# Patient Record
Sex: MALE | Race: WHITE
[De-identification: names, ages, dates, MRNs, and addresses within clinical notes are randomized per-mention and may not be internally consistent; named-entity substitution may affect disease eponyms.]

---

## 2017-01-09 ENCOUNTER — APPOINTMENT (OUTPATIENT)
Dept: PEDIATRICS | Facility: CLINIC | Age: 22
End: 2017-01-09

## 2018-05-08 ENCOUNTER — INPATIENT (INPATIENT)
Facility: HOSPITAL | Age: 23
LOS: 2 days | Discharge: HOME | End: 2018-05-11
Attending: THORACIC SURGERY (CARDIOTHORACIC VASCULAR SURGERY) | Admitting: THORACIC SURGERY (CARDIOTHORACIC VASCULAR SURGERY)
Payer: COMMERCIAL

## 2018-05-08 ENCOUNTER — APPOINTMENT (OUTPATIENT)
Dept: THORACIC SURGERY | Facility: HOSPITAL | Age: 23
End: 2018-05-08

## 2018-05-08 VITALS — WEIGHT: 145.06 LBS | OXYGEN SATURATION: 99 % | HEART RATE: 92 BPM | HEIGHT: 66 IN

## 2018-05-08 DIAGNOSIS — J93.9 PNEUMOTHORAX, UNSPECIFIED: ICD-10-CM

## 2018-05-08 LAB
ALBUMIN SERPL ELPH-MCNC: 4.5 G/DL — SIGNIFICANT CHANGE UP (ref 3.3–5)
ALP SERPL-CCNC: 67 U/L — SIGNIFICANT CHANGE UP (ref 40–120)
ALT FLD-CCNC: 17 U/L — SIGNIFICANT CHANGE UP (ref 12–78)
ANION GAP SERPL CALC-SCNC: 7 MMOL/L — SIGNIFICANT CHANGE UP (ref 5–17)
AST SERPL-CCNC: 17 U/L — SIGNIFICANT CHANGE UP (ref 15–37)
BASOPHILS # BLD AUTO: 0.03 K/UL — SIGNIFICANT CHANGE UP (ref 0–0.2)
BASOPHILS NFR BLD AUTO: 0.3 % — SIGNIFICANT CHANGE UP (ref 0–2)
BILIRUB SERPL-MCNC: 1.8 MG/DL — HIGH (ref 0.2–1.2)
BUN SERPL-MCNC: 15 MG/DL — SIGNIFICANT CHANGE UP (ref 7–23)
CALCIUM SERPL-MCNC: 9.3 MG/DL — SIGNIFICANT CHANGE UP (ref 8.5–10.1)
CHLORIDE SERPL-SCNC: 107 MMOL/L — SIGNIFICANT CHANGE UP (ref 96–108)
CO2 SERPL-SCNC: 28 MMOL/L — SIGNIFICANT CHANGE UP (ref 22–31)
CREAT SERPL-MCNC: 1.08 MG/DL — SIGNIFICANT CHANGE UP (ref 0.5–1.3)
EOSINOPHIL # BLD AUTO: 0.08 K/UL — SIGNIFICANT CHANGE UP (ref 0–0.5)
EOSINOPHIL NFR BLD AUTO: 0.8 % — SIGNIFICANT CHANGE UP (ref 0–6)
GLUCOSE SERPL-MCNC: 90 MG/DL — SIGNIFICANT CHANGE UP (ref 70–99)
HCT VFR BLD CALC: 47 % — SIGNIFICANT CHANGE UP (ref 39–50)
HGB BLD-MCNC: 16.3 G/DL — SIGNIFICANT CHANGE UP (ref 13–17)
IMM GRANULOCYTES NFR BLD AUTO: 0.3 % — SIGNIFICANT CHANGE UP (ref 0–1.5)
LYMPHOCYTES # BLD AUTO: 1.39 K/UL — SIGNIFICANT CHANGE UP (ref 1–3.3)
LYMPHOCYTES # BLD AUTO: 14.6 % — SIGNIFICANT CHANGE UP (ref 13–44)
MCHC RBC-ENTMCNC: 29.7 PG — SIGNIFICANT CHANGE UP (ref 27–34)
MCHC RBC-ENTMCNC: 34.7 GM/DL — SIGNIFICANT CHANGE UP (ref 32–36)
MCV RBC AUTO: 85.8 FL — SIGNIFICANT CHANGE UP (ref 80–100)
MONOCYTES # BLD AUTO: 0.8 K/UL — SIGNIFICANT CHANGE UP (ref 0–0.9)
MONOCYTES NFR BLD AUTO: 8.4 % — SIGNIFICANT CHANGE UP (ref 2–14)
NEUTROPHILS # BLD AUTO: 7.16 K/UL — SIGNIFICANT CHANGE UP (ref 1.8–7.4)
NEUTROPHILS NFR BLD AUTO: 75.6 % — SIGNIFICANT CHANGE UP (ref 43–77)
NRBC # BLD: 0 /100 WBCS — SIGNIFICANT CHANGE UP (ref 0–0)
PLATELET # BLD AUTO: 329 K/UL — SIGNIFICANT CHANGE UP (ref 150–400)
POTASSIUM SERPL-MCNC: 3.8 MMOL/L — SIGNIFICANT CHANGE UP (ref 3.5–5.3)
POTASSIUM SERPL-SCNC: 3.8 MMOL/L — SIGNIFICANT CHANGE UP (ref 3.5–5.3)
PROT SERPL-MCNC: 7.9 GM/DL — SIGNIFICANT CHANGE UP (ref 6–8.3)
RBC # BLD: 5.48 M/UL — SIGNIFICANT CHANGE UP (ref 4.2–5.8)
RBC # FLD: 12.5 % — SIGNIFICANT CHANGE UP (ref 10.3–14.5)
SODIUM SERPL-SCNC: 142 MMOL/L — SIGNIFICANT CHANGE UP (ref 135–145)
WBC # BLD: 9.49 K/UL — SIGNIFICANT CHANGE UP (ref 3.8–10.5)
WBC # FLD AUTO: 9.49 K/UL — SIGNIFICANT CHANGE UP (ref 3.8–10.5)

## 2018-05-08 PROCEDURE — 32550 INSERT PLEURAL CATH: CPT

## 2018-05-08 PROCEDURE — 71045 X-RAY EXAM CHEST 1 VIEW: CPT | Mod: 26

## 2018-05-08 PROCEDURE — 99285 EMERGENCY DEPT VISIT HI MDM: CPT

## 2018-05-08 PROCEDURE — 71045 X-RAY EXAM CHEST 1 VIEW: CPT | Mod: 26,77,76

## 2018-05-08 RX ORDER — DOCUSATE SODIUM 100 MG
100 CAPSULE ORAL THREE TIMES A DAY
Qty: 0 | Refills: 0 | Status: DISCONTINUED | OUTPATIENT
Start: 2018-05-08 | End: 2018-05-11

## 2018-05-08 RX ORDER — IBUPROFEN 200 MG
400 TABLET ORAL EVERY 4 HOURS
Qty: 0 | Refills: 0 | Status: DISCONTINUED | OUTPATIENT
Start: 2018-05-08 | End: 2018-05-09

## 2018-05-08 RX ORDER — OXYCODONE AND ACETAMINOPHEN 5; 325 MG/1; MG/1
1 TABLET ORAL EVERY 4 HOURS
Qty: 0 | Refills: 0 | Status: DISCONTINUED | OUTPATIENT
Start: 2018-05-08 | End: 2018-05-09

## 2018-05-08 RX ORDER — MORPHINE SULFATE 50 MG/1
4 CAPSULE, EXTENDED RELEASE ORAL
Qty: 0 | Refills: 0 | Status: DISCONTINUED | OUTPATIENT
Start: 2018-05-08 | End: 2018-05-09

## 2018-05-08 RX ORDER — SENNA PLUS 8.6 MG/1
2 TABLET ORAL AT BEDTIME
Qty: 0 | Refills: 0 | Status: DISCONTINUED | OUTPATIENT
Start: 2018-05-08 | End: 2018-05-11

## 2018-05-08 RX ADMIN — OXYCODONE AND ACETAMINOPHEN 1 TABLET(S): 5; 325 TABLET ORAL at 21:46

## 2018-05-08 RX ADMIN — MORPHINE SULFATE 4 MILLIGRAM(S): 50 CAPSULE, EXTENDED RELEASE ORAL at 18:50

## 2018-05-08 RX ADMIN — OXYCODONE AND ACETAMINOPHEN 1 TABLET(S): 5; 325 TABLET ORAL at 19:45

## 2018-05-08 RX ADMIN — Medication 100 MILLIGRAM(S): at 22:25

## 2018-05-08 RX ADMIN — MORPHINE SULFATE 4 MILLIGRAM(S): 50 CAPSULE, EXTENDED RELEASE ORAL at 22:23

## 2018-05-08 NOTE — ED PROVIDER NOTE - PROGRESS NOTE DETAILS
Erick Gomez: Dr. Ross spoke with cardiothoracic surgeon Dr. Osmany Mayen who will come in to place a pigtail catheter. Will obtain cardiothoracic consultation. Erick Gomez: Dr. Ross spoke with cardiothoracic surgeon Dr. Osmany Mayen who will come in to place a pigtail catheter. Will obtain cardiothoracic consent for procedure. Cardiothoracic consultation is appreciated.

## 2018-05-08 NOTE — ED ADULT TRIAGE NOTE - CHIEF COMPLAINT QUOTE
Patient sent in by Dr osorio reports left sided pneumothorax with shortness of breath. Patient has history of asthma and started wheezing yesterday Patient sent in by Dr osorio reports left sided pneumothorax with shortness of breath and chest pain. Patient has history of asthma and started wheezing yesterday

## 2018-05-08 NOTE — ED PROVIDER NOTE - OBJECTIVE STATEMENT
22 y/o male with PMHx of asthma presents to the ED c/o SOB and left sided CP after waking up this AM. Pt states he visited his PMD today who [facility]. Pt was sent to the ED after xray findings of a left sided pneumothorax, moderately extending to upper, middle and lower hemithorax. Pt is currently breathing on his own. No tachypnea, retractions, fever, chills, neck rigidity or stiffness, or abd pain.

## 2018-05-08 NOTE — ED PROVIDER NOTE - NS_ ATTENDINGSCRIBEDETAILS _ED_A_ED_FT
I Corbin Ross MD saw and examined the patient. Scribe documented for me and under my supervision. I have modified the scribe's documentation where necessary to reflect my history, physical exam and other relevant documentations pertinent to the care of the patient.

## 2018-05-08 NOTE — CONSULT NOTE ADULT - SUBJECTIVE AND OBJECTIVE BOX
22 y/o M with complaints of shortness of breath and chest pain x 1 day.  Pt found to have a large left spontaenous pneumothorax.  Left pigtail catheter placed in Ed with post procedure cxr showing good re-expansion.  Pt currentlt resting comfortably.    PMHx-Asthma  PSHx-none    Physical Exam  HEENT-no lymphadenopathy  Lungs-B/L BS+  Chest-Left pigtail-intact, no air leak

## 2018-05-09 DIAGNOSIS — J45.20 MILD INTERMITTENT ASTHMA, UNCOMPLICATED: ICD-10-CM

## 2018-05-09 PROCEDURE — 71045 X-RAY EXAM CHEST 1 VIEW: CPT | Mod: 26

## 2018-05-09 RX ORDER — ALBUTEROL 90 UG/1
1 AEROSOL, METERED ORAL EVERY 4 HOURS
Qty: 0 | Refills: 0 | Status: DISCONTINUED | OUTPATIENT
Start: 2018-05-09 | End: 2018-05-11

## 2018-05-09 RX ORDER — ONDANSETRON 8 MG/1
4 TABLET, FILM COATED ORAL ONCE
Qty: 0 | Refills: 0 | Status: COMPLETED | OUTPATIENT
Start: 2018-05-09 | End: 2018-05-09

## 2018-05-09 RX ORDER — MONTELUKAST 4 MG/1
1 TABLET, CHEWABLE ORAL
Qty: 0 | Refills: 0 | COMMUNITY

## 2018-05-09 RX ORDER — ALBUTEROL 90 UG/1
2.5 AEROSOL, METERED ORAL EVERY 6 HOURS
Qty: 0 | Refills: 0 | Status: DISCONTINUED | OUTPATIENT
Start: 2018-05-09 | End: 2018-05-11

## 2018-05-09 RX ORDER — FLUTICASONE PROPIONATE AND SALMETEROL 50; 250 UG/1; UG/1
1 POWDER ORAL; RESPIRATORY (INHALATION)
Qty: 0 | Refills: 0 | COMMUNITY

## 2018-05-09 RX ORDER — LORATADINE 10 MG/1
10 TABLET ORAL DAILY
Qty: 0 | Refills: 0 | Status: DISCONTINUED | OUTPATIENT
Start: 2018-05-09 | End: 2018-05-11

## 2018-05-09 RX ORDER — MONTELUKAST 4 MG/1
10 TABLET, CHEWABLE ORAL AT BEDTIME
Qty: 0 | Refills: 0 | Status: DISCONTINUED | OUTPATIENT
Start: 2018-05-09 | End: 2018-05-11

## 2018-05-09 RX ORDER — KETOROLAC TROMETHAMINE 30 MG/ML
30 SYRINGE (ML) INJECTION EVERY 6 HOURS
Qty: 0 | Refills: 0 | Status: DISCONTINUED | OUTPATIENT
Start: 2018-05-09 | End: 2018-05-11

## 2018-05-09 RX ORDER — BUDESONIDE AND FORMOTEROL FUMARATE DIHYDRATE 160; 4.5 UG/1; UG/1
2 AEROSOL RESPIRATORY (INHALATION)
Qty: 0 | Refills: 0 | Status: DISCONTINUED | OUTPATIENT
Start: 2018-05-09 | End: 2018-05-11

## 2018-05-09 RX ORDER — CETIRIZINE HYDROCHLORIDE 10 MG/1
1 TABLET ORAL
Qty: 0 | Refills: 0 | COMMUNITY

## 2018-05-09 RX ADMIN — Medication 100 MILLIGRAM(S): at 21:35

## 2018-05-09 RX ADMIN — MONTELUKAST 10 MILLIGRAM(S): 4 TABLET, CHEWABLE ORAL at 21:35

## 2018-05-09 RX ADMIN — Medication 30 MILLIGRAM(S): at 17:32

## 2018-05-09 RX ADMIN — ONDANSETRON 4 MILLIGRAM(S): 8 TABLET, FILM COATED ORAL at 10:49

## 2018-05-09 RX ADMIN — Medication 30 MILLIGRAM(S): at 23:39

## 2018-05-09 RX ADMIN — OXYCODONE AND ACETAMINOPHEN 1 TABLET(S): 5; 325 TABLET ORAL at 03:05

## 2018-05-09 RX ADMIN — LORATADINE 10 MILLIGRAM(S): 10 TABLET ORAL at 10:52

## 2018-05-09 RX ADMIN — Medication 30 MILLIGRAM(S): at 10:50

## 2018-05-09 RX ADMIN — MORPHINE SULFATE 4 MILLIGRAM(S): 50 CAPSULE, EXTENDED RELEASE ORAL at 03:43

## 2018-05-09 RX ADMIN — MORPHINE SULFATE 4 MILLIGRAM(S): 50 CAPSULE, EXTENDED RELEASE ORAL at 06:39

## 2018-05-09 NOTE — CONSULT NOTE ADULT - ASSESSMENT
22 y/o M with left spontaneous ptx
PROBLEMS:    Spontaneous Lt Pneumothorax S/P PIGTAIL CATH  Moderate perisitant Asthma  Seasonal environmental Allergies    PLAN:    ct surgery fu  symbicort  singulair  loratadine  albuter nebs prn  dvt prophylasix
Pt is a 22 y/o male with h/o Asthma, seasonal allergies who is very active developed sudden onset of Lt sided chest pain and SOB.  Outpt CXR ordered by his allergist revealed large Lt pneumothorax.  Pt was sent to  ER, 14F pigtail cath placed with good response.  Pt was admitted by Dr Mayen, medicine consult called for comanagement.     * Spontaneous Lt Pneumothorax-continue CT management per Dr Mayen, encourage use of incentive spirometry  * Asthma-stable, continue advair, singular   * Seasonal Allergies-zyrtec  * Disp-OOB, ambulate  * Comm- d/w pt, RN  * Proph- pt is ambulating

## 2018-05-09 NOTE — H&P ADULT - NSHPREVIEWOFSYSTEMS_GEN_ALL_CORE
gen: denies weight loss, fatigue, fever/chills  Neuro: denies headache, CVA  ENT: + seasonal allergies  Resp: + asthma, + sob  CV: denies palps,   GI: denies n/v/d

## 2018-05-09 NOTE — H&P ADULT - ASSESSMENT
23M, non smoker, pmhx asthma with first spontaneous pneumothorax on left.  Good reexpansion without air leak with 14f pigtail.  Maintain to water seal today, if lung stays expanded in AM, will clamp tube for short duration and repeat imagining, if still stable.  Pt may be dc/d home.    Pt understands a/p.  all questions anwersed.  Will speak with pts parents later on today.    D/w dr. shirley

## 2018-05-09 NOTE — H&P ADULT - HISTORY OF PRESENT ILLNESS
23M   PMHx Asthma presented to ED yesturday with acute left sided chest pain and sob that awoke him from his sleep.  Pt originally had went to his PCP who sent for outside chest Xray which discovered large left pneumothorax.  14F pigtail was placed upon arrival to ER with good expansion of left lung, with relieve of symptoms.  Pt denies any recent chest trauma, h/o pneumothoracies, tobacco smoking, marijuana or e-cigs.  Today pt feels well, however still with mild pain at site of chest tube.

## 2018-05-09 NOTE — CONSULT NOTE ADULT - SUBJECTIVE AND OBJECTIVE BOX
Date of Service 05-09-18 @ 08:40    Patient is a 23y old  Male who presents with a chief complaint of Left sided spontaneous Pneumothorax (09 May 2018 08:14)      HPI: Pt is a 24 y/o male with h/o Asthma, seasonal allergies who is very active developed sudden onset of Lt sided chest pain and SOB.  Outpt CXR ordered by his allergist revealed large Lt pneumothorax.  Pt was sent to  ER, 14F pigtail cath placed with good response.  Pt was admitted by Dr Mayen, medicine consult called for comanagement. C/o mild Lt chest soreness from the CT, no SOB, fevers or chills.     PAST MEDICAL & SURGICAL HISTORY:  Asthma, seasonal allergies  [place] tooth extraction      Allergies    No Known Allergies    Intolerances        MEDICATIONS  (STANDING):  docusate sodium 100 milliGRAM(s) Oral three times a day    MEDICATIONS  (PRN):  ibuprofen  Tablet 400 milliGRAM(s) Oral every 4 hours PRN Temperature greater than 38.3C (101.0F)  morphine  - Injectable 4 milliGRAM(s) IV Push every 3 hours PRN Moderate Pain (4 - 6)  oxyCODONE    5 mG/acetaminophen 325 mG 1 Tablet(s) Oral every 4 hours PRN Mild Pain  senna 2 Tablet(s) Oral at bedtime PRN Constipation      FAMILY HISTORY: Dad with A fib      Social History: denies smoking or ETOH       Vital Signs Last 24 Hrs  T(C): 36.7 (09 May 2018 05:15), Max: 36.8 (08 May 2018 16:33)  T(F): 98.1 (09 May 2018 05:15), Max: 98.3 (08 May 2018 16:33)  HR: 66 (09 May 2018 05:15) (66 - 92)  BP: 117/57 (09 May 2018 05:15) (109/64 - 147/136)  BP(mean): --  RR: 18 (09 May 2018 05:15) (18 - 23)  SpO2: 100% (09 May 2018 05:15) (93% - 100%)    Daily Height in cm: 167.64 (08 May 2018 16:14)    Daily     I&O's Summary    08 May 2018 07:01  -  09 May 2018 07:00  --------------------------------------------------------  IN: 120 mL / OUT: 0 mL / NET: 120 mL          Data                          16.3   9.49  )-----------( 329      ( 08 May 2018 16:30 )             47.0       05-08    142  |  107  |  15  ----------------------------<  90  3.8   |  28  |  1.08    Ca    9.3      08 May 2018 16:30    TPro  7.9  /  Alb  4.5  /  TBili  1.8<H>  /  DBili  x   /  AST  17  /  ALT  17  /  AlkPhos  67  05-08                LIVER FUNCTIONS - ( 08 May 2018 16:30 )  Alb: 4.5 g/dL / Pro: 7.9 gm/dL / ALK PHOS: 67 U/L / ALT: 17 U/L / AST: 17 U/L / GGT: x

## 2018-05-09 NOTE — CHART NOTE - NSCHARTNOTEFT_GEN_A_CORE
Pt w/ repeat chest xray this AM with apical pneumothorax on water seal  Placed pt back on 20cm suction,   w/ small evacuation of air.  No persistent airleak    pt stable    RN instructed w/ change in plan

## 2018-05-09 NOTE — H&P ADULT - NSHPSOCIALHISTORY_GEN_ALL_CORE
Denies tobacco, illicit drugs, e-cigs, etoh  Lives with parents  Active student at [employer]

## 2018-05-09 NOTE — CONSULT NOTE ADULT - SUBJECTIVE AND OBJECTIVE BOX
HPI:    23 y.o.m with PMHx of Asthma admitted yesturday with acute left sided chest pain and sob that awoke him from his sleep.  Pt was seen by PCP as outpat who did chest Xray which shows large left pneumothorax.  In ED 14F pigtail was placed with good expansion of left lung, with relieve of symptoms.  Pt denies any recent chest trauma, no h/o pneumothoracies, tobacco smoking, marijuana or e-cigs. pat was ask to see for h/o asthma & usually folowed up by allergist in [facility].    PAST MEDICAL & SURGICAL HISTORY:  ASTHMA  MULTIPLE ALLERGIES      Home Medications:  Advair Diskus 100 mcg-50 mcg inhalation powder: 1 puff(s) inhaled 2 times a day (09 May 2018 08:23)  Singulair 10 mg oral tablet: 1 tab(s) orally once a day (09 May 2018 08:23)  ZyrTEC 5 mg oral tablet: 1 tab(s) orally once a day (09 May 2018 08:23)      MEDICATIONS  (STANDING):  buDESOnide 160 MICROgram(s)/formoterol 4.5 MICROgram(s) Inhaler 2 Puff(s) Inhalation two times a day  docusate sodium 100 milliGRAM(s) Oral three times a day  loratadine 10 milliGRAM(s) Oral daily  montelukast 10 milliGRAM(s) Oral at bedtime  ondansetron Injectable 4 milliGRAM(s) IV Push once    MEDICATIONS  (PRN):  ketorolac   Injectable 30 milliGRAM(s) IV Push every 6 hours PRN Moderate Pain (4 - 6)  senna 2 Tablet(s) Oral at bedtime PRN Constipation      Allergies    No Known Allergies    Intolerances        SOCIAL HISTORY: Denies tobacco, etoh abuse or illicit drug use    FAMILY HISTORY:  No pertinent family history in first degree relatives      Vital Signs Last 24 Hrs  T(C): 36.7 (09 May 2018 05:15), Max: 36.8 (08 May 2018 16:33)  T(F): 98.1 (09 May 2018 05:15), Max: 98.3 (08 May 2018 16:33)  HR: 66 (09 May 2018 05:15) (66 - 92)  BP: 117/57 (09 May 2018 05:15) (109/64 - 147/136)  BP(mean): --  RR: 18 (09 May 2018 05:15) (18 - 23)  SpO2: 100% (09 May 2018 05:15) (93% - 100%)        REVIEW OF SYSTEMS:    CONSTITUTIONAL:  As per HPI.  HEENT:  Eyes:  No diplopia or blurred vision. ENT:  No earache, sore throat or runny nose.  CARDIOVASCULAR:  No pressure, squeezing, tightness, heaviness or aching about the chest, neck, axilla or epigastrium.  RESPIRATORY:  No cough, shortness of breath, PND or orthopnea.  GASTROINTESTINAL:  No nausea, vomiting or diarrhea.  GENITOURINARY:  No dysuria, frequency or urgency.  MUSCULOSKELETAL:  As per HPI.  SKIN:  No change in skin, hair or nails.  NEUROLOGIC:  No paresthesias, fasciculations, seizures or weakness.  PSYCHIATRIC:  No disorder of thought or mood.  ENDOCRINE:  No heat or cold intolerance, polyuria or polydipsia.  HEMATOLOGICAL:  No easy bruising or bleedings:  .     PHYSICAL EXAMINATION:    GENERAL APPEARANCE:  Pt. is not currently dyspneic, in no distress. Pt. is alert, oriented, and pleasant.  HEENT:  Pupils are normal and react normally. No icterus. Mucous membranes well colored.  NECK:  Supple. No lymphadenopathy. Jugular venous pressure not elevated. Carotids equal.   HEART:   The cardiac impulse has a normal quality. Regular. Normal S1 and S2. There are no murmurs, rubs or gallops noted  CHEST:  Chest is clear to auscultation. Normal respiratory effort.  ABDOMEN:  Soft and nontender.   EXTREMITIES:  There is no cyanosis, clubbing or edema.   SKIN:  No rash or significant lesions are noted.    LABS:                        16.3   9.49  )-----------( 329      ( 08 May 2018 16:30 )             47.0     05-08    142  |  107  |  15  ----------------------------<  90  3.8   |  28  |  1.08    Ca    9.3      08 May 2018 16:30    TPro  7.9  /  Alb  4.5  /  TBili  1.8<H>  /  DBili  x   /  AST  17  /  ALT  17  /  AlkPhos  67  05-08    LIVER FUNCTIONS - ( 08 May 2018 16:30 )  Alb: 4.5 g/dL / Pro: 7.9 gm/dL / ALK PHOS: 67 U/L / ALT: 17 U/L / AST: 17 U/L / GGT: x             RADIOLOGY & ADDITIONAL STUDIES:    CXR; LEFT CHEST PIGTAIL CATH, NO RESIDUAL PNEUMOTHORAX

## 2018-05-09 NOTE — H&P ADULT - NSHPPHYSICALEXAM_GEN_ALL_CORE
Gen: NAD, appears comfortable  Neuro: non focal, a0x3  ENT: Neck supple, no adenopathy palpapted  Pulm: b/l cta,  unlabored respirations,  + left chest tube on water seal, no airleak  CV: s1/s2  no murmurs  Abd: soft nt/nd  EXT: warm, well perfused, no edema  SKin: no rashes

## 2018-05-10 PROCEDURE — 71045 X-RAY EXAM CHEST 1 VIEW: CPT | Mod: 26,76

## 2018-05-10 PROCEDURE — 99232 SBSQ HOSP IP/OBS MODERATE 35: CPT

## 2018-05-10 RX ADMIN — Medication 100 MILLIGRAM(S): at 21:58

## 2018-05-10 RX ADMIN — MONTELUKAST 10 MILLIGRAM(S): 4 TABLET, CHEWABLE ORAL at 21:58

## 2018-05-10 RX ADMIN — Medication 100 MILLIGRAM(S): at 05:54

## 2018-05-10 RX ADMIN — LORATADINE 10 MILLIGRAM(S): 10 TABLET ORAL at 10:27

## 2018-05-10 RX ADMIN — Medication 30 MILLIGRAM(S): at 14:08

## 2018-05-10 RX ADMIN — Medication 30 MILLIGRAM(S): at 05:54

## 2018-05-10 RX ADMIN — Medication 100 MILLIGRAM(S): at 14:17

## 2018-05-10 RX ADMIN — Medication 30 MILLIGRAM(S): at 18:20

## 2018-05-10 RX ADMIN — Medication 30 MILLIGRAM(S): at 12:47

## 2018-05-10 RX ADMIN — BUDESONIDE AND FORMOTEROL FUMARATE DIHYDRATE 2 PUFF(S): 160; 4.5 AEROSOL RESPIRATORY (INHALATION) at 19:30

## 2018-05-11 ENCOUNTER — TRANSCRIPTION ENCOUNTER (OUTPATIENT)
Age: 23
End: 2018-05-11

## 2018-05-11 VITALS
RESPIRATION RATE: 16 BRPM | TEMPERATURE: 99 F | OXYGEN SATURATION: 98 % | HEART RATE: 69 BPM | SYSTOLIC BLOOD PRESSURE: 107 MMHG | DIASTOLIC BLOOD PRESSURE: 64 MMHG

## 2018-05-11 PROCEDURE — 71045 X-RAY EXAM CHEST 1 VIEW: CPT | Mod: 26,76

## 2018-05-11 RX ORDER — DOCUSATE SODIUM 100 MG
1 CAPSULE ORAL
Qty: 0 | Refills: 0 | COMMUNITY
Start: 2018-05-11

## 2018-05-11 RX ORDER — SENNA PLUS 8.6 MG/1
2 TABLET ORAL
Qty: 0 | Refills: 0 | COMMUNITY
Start: 2018-05-11

## 2018-05-11 RX ORDER — BUDESONIDE AND FORMOTEROL FUMARATE DIHYDRATE 160; 4.5 UG/1; UG/1
0 AEROSOL RESPIRATORY (INHALATION)
Qty: 0 | Refills: 0 | COMMUNITY
Start: 2018-05-11

## 2018-05-11 RX ADMIN — Medication 100 MILLIGRAM(S): at 05:54

## 2018-05-11 RX ADMIN — Medication 100 MILLIGRAM(S): at 11:30

## 2018-05-11 RX ADMIN — Medication 30 MILLIGRAM(S): at 04:33

## 2018-05-11 RX ADMIN — Medication 10 MILLILITER(S): at 00:26

## 2018-05-11 RX ADMIN — LORATADINE 10 MILLIGRAM(S): 10 TABLET ORAL at 11:30

## 2018-05-11 NOTE — DISCHARGE NOTE ADULT - MEDICATION SUMMARY - MEDICATIONS TO TAKE
I will START or STAY ON the medications listed below when I get home from the hospital:    ZyrTEC 5 mg oral tablet  -- 1 tab(s) by mouth once a day  -- Indication: For Mild intermittent asthma, unspecified whether complicated    budesonide-formoterol 160 mcg-4.5 mcg/inh inhalation aerosol  --  inhaled   -- Indication: For Mild intermittent asthma, unspecified whether complicated    Advair Diskus 100 mcg-50 mcg inhalation powder  -- 1 puff(s) inhaled 2 times a day  -- Indication: For Mild intermittent asthma, unspecified whether complicated    docusate sodium 100 mg oral capsule  -- 1 cap(s) by mouth 3 times a day  -- Indication: For constipation    senna oral tablet  -- 2 tab(s) by mouth once a day (at bedtime), As needed, Constipation  -- Indication: For constipation    Singulair 10 mg oral tablet  -- 1 tab(s) by mouth once a day  -- Indication: For Mild intermittent asthma, unspecified whether complicated

## 2018-05-11 NOTE — DISCHARGE NOTE ADULT - CARE PROVIDER_API CALL
Osmany Mayen), Thoracic Surgery  [address] [place] [address]  Phone: [phone]  Fax: [phone]

## 2018-05-11 NOTE — PROGRESS NOTE ADULT - SUBJECTIVE AND OBJECTIVE BOX
CT surgery input, CT placed back on suction.  No new complaints.    Date of Service: 05-10-18 @ 08:53    Vital Signs Last 24 Hrs  T(C): 36.6 (10 May 2018 04:35), Max: 36.8 (09 May 2018 10:39)  T(F): 97.8 (10 May 2018 04:35), Max: 98.3 (09 May 2018 10:39)  HR: 67 (10 May 2018 04:35) (67 - 78)  BP: 106/55 (10 May 2018 04:35) (106/55 - 112/61)  BP(mean): 67 (10 May 2018 04:35) (67 - 67)  RR: 18 (09 May 2018 10:39) (18 - 18)  SpO2: 98% (10 May 2018 04:35) (98% - 98%)    Daily     Daily     I&O's Detail      CAPILLARY BLOOD GLUCOSE                                          16.3   9.49  )-----------( 329      ( 08 May 2018 16:30 )             47.0       05-08    142  |  107  |  15  ----------------------------<  90  3.8   |  28  |  1.08    Ca    9.3      08 May 2018 16:30    TPro  7.9  /  Alb  4.5  /  TBili  1.8<H>  /  DBili  x   /  AST  17  /  ALT  17  /  AlkPhos  67  05-08          LIVER FUNCTIONS - ( 08 May 2018 16:30 )  Alb: 4.5 g/dL / Pro: 7.9 gm/dL / ALK PHOS: 67 U/L / ALT: 17 U/L / AST: 17 U/L / GGT: x                     MEDICATIONS  (STANDING):  ALBUTerol    90 MICROgram(s) HFA Inhaler 1 Puff(s) Inhalation every 4 hours  buDESOnide 160 MICROgram(s)/formoterol 4.5 MICROgram(s) Inhaler 2 Puff(s) Inhalation two times a day  docusate sodium 100 milliGRAM(s) Oral three times a day  loratadine 10 milliGRAM(s) Oral daily  montelukast 10 milliGRAM(s) Oral at bedtime    MEDICATIONS  (PRN):  ALBUTerol    0.083% 2.5 milliGRAM(s) Nebulizer every 6 hours PRN Bronchospasm  ketorolac   Injectable 30 milliGRAM(s) IV Push every 6 hours PRN Moderate Pain (4 - 6)  senna 2 Tablet(s) Oral at bedtime PRN Constipation
Pt with continued apical PTX on CXR at 1pm. Pt denies CP/SOB. Will continue waterseal, pt instructed to alert RN with any change in symptoms.
Pt with uneventful night, c/o mild Lt CT discomfort, slept well.    Date of Service: 05-11-18 @ 09:14    Vital Signs Last 24 Hrs  T(C): 36.8 (11 May 2018 04:41), Max: 37.1 (10 May 2018 17:05)  T(F): 98.2 (11 May 2018 04:41), Max: 98.7 (10 May 2018 17:05)  HR: 81 (11 May 2018 04:41) (68 - 84)  BP: 119/64 (11 May 2018 04:41) (106/80 - 119/64)  BP(mean): --  RR: 18 (10 May 2018 10:57) (18 - 18)  SpO2: 97% (11 May 2018 04:41) (97% - 98%)    Daily     Daily     I&O's Detail      CAPILLARY BLOOD GLUCOSE                                                MEDICATIONS  (STANDING):  ALBUTerol    90 MICROgram(s) HFA Inhaler 1 Puff(s) Inhalation every 4 hours  buDESOnide 160 MICROgram(s)/formoterol 4.5 MICROgram(s) Inhaler 2 Puff(s) Inhalation two times a day  docusate sodium 100 milliGRAM(s) Oral three times a day  loratadine 10 milliGRAM(s) Oral daily  montelukast 10 milliGRAM(s) Oral at bedtime    MEDICATIONS  (PRN):  ALBUTerol    0.083% 2.5 milliGRAM(s) Nebulizer every 6 hours PRN Bronchospasm  ketorolac   Injectable 30 milliGRAM(s) IV Push every 6 hours PRN Moderate Pain (4 - 6)  senna 2 Tablet(s) Oral at bedtime PRN Constipation
Subjective:    pat no new complaint, chest tube on suction neg 20.    Home Medications:  Advair Diskus 100 mcg-50 mcg inhalation powder: 1 puff(s) inhaled 2 times a day (09 May 2018 08:23)  Singulair 10 mg oral tablet: 1 tab(s) orally once a day (09 May 2018 08:23)  ZyrTEC 5 mg oral tablet: 1 tab(s) orally once a day (09 May 2018 08:23)    MEDICATIONS  (STANDING):  ALBUTerol    90 MICROgram(s) HFA Inhaler 1 Puff(s) Inhalation every 4 hours  buDESOnide 160 MICROgram(s)/formoterol 4.5 MICROgram(s) Inhaler 2 Puff(s) Inhalation two times a day  docusate sodium 100 milliGRAM(s) Oral three times a day  loratadine 10 milliGRAM(s) Oral daily  montelukast 10 milliGRAM(s) Oral at bedtime    MEDICATIONS  (PRN):  ALBUTerol    0.083% 2.5 milliGRAM(s) Nebulizer every 6 hours PRN Bronchospasm  ketorolac   Injectable 30 milliGRAM(s) IV Push every 6 hours PRN Moderate Pain (4 - 6)  senna 2 Tablet(s) Oral at bedtime PRN Constipation      Allergies    No Known Allergies    Intolerances        Vital Signs Last 24 Hrs  T(C): 36.6 (10 May 2018 04:35), Max: 36.6 (10 May 2018 04:35)  T(F): 97.8 (10 May 2018 04:35), Max: 97.8 (10 May 2018 04:35)  HR: 67 (10 May 2018 04:35) (67 - 67)  BP: 106/55 (10 May 2018 04:35) (106/55 - 106/55)  BP(mean): 67 (10 May 2018 04:35) (67 - 67)  RR: --  SpO2: 98% (10 May 2018 04:35) (98% - 98%)      PHYSICAL EXAMINATION:    NECK:  Supple. No lymphadenopathy. Jugular venous pressure not elevated. Carotids equal.   HEART:   The cardiac impulse has a normal quality. Reg., Nl S1 and S2.  There are no murmurs, rubs or gallops noted  CHEST:  Chest few rhonchi to auscultation. Normal respiratory effort.  ABDOMEN:  Soft and nontender.   EXTREMITIES:  There is no edema.       LABS:                        16.3   9.49  )-----------( 329      ( 08 May 2018 16:30 )             47.0     05-08    142  |  107  |  15  ----------------------------<  90  3.8   |  28  |  1.08    Ca    9.3      08 May 2018 16:30    TPro  7.9  /  Alb  4.5  /  TBili  1.8<H>  /  DBili  x   /  AST  17  /  ALT  17  /  AlkPhos  67  05-08
Subjective:  22 y/o M with complaints of shortness of breath and chest pain x 1 day.  Pt found to have a large left spontaenous pneumothorax.  Left pigtail catheter placed in Ed with post procedure cxr showing good re-expansion. 5/9 noted to have reaccumulation of PTX, CT placed to suction. Pending this am CXR.  Pt currentlt resting comfortably, no complaints ON. Breathing without difficulty. C/o Tube incision site tenderness    Vital Signs:  Vital Signs Last 24 Hrs  T(C): 36.6 (05-10-18 @ 04:35), Max: 36.8 (05-09-18 @ 10:39)  T(F): 97.8 (05-10-18 @ 04:35), Max: 98.3 (05-09-18 @ 10:39)  HR: 67 (05-10-18 @ 04:35) (67 - 78)  BP: 106/55 (05-10-18 @ 04:35) (106/55 - 112/61)  RR: 18 (05-09-18 @ 10:39) (18 - 18)  SpO2: 98% (05-10-18 @ 04:35) (98% - 98%) on (O2)    Telemetry/Alarms:    Relevant labs, radiology and Medications reviewed                        16.3   9.49  )-----------( 329      ( 08 May 2018 16:30 )             47.0     05-08    142  |  107  |  15  ----------------------------<  90  3.8   |  28  |  1.08    Ca    9.3      08 May 2018 16:30    TPro  7.9  /  Alb  4.5  /  TBili  1.8<H>  /  DBili  x   /  AST  17  /  ALT  17  /  AlkPhos  67  05-08      MEDICATIONS  (STANDING):  ALBUTerol    90 MICROgram(s) HFA Inhaler 1 Puff(s) Inhalation every 4 hours  buDESOnide 160 MICROgram(s)/formoterol 4.5 MICROgram(s) Inhaler 2 Puff(s) Inhalation two times a day  docusate sodium 100 milliGRAM(s) Oral three times a day  loratadine 10 milliGRAM(s) Oral daily  montelukast 10 milliGRAM(s) Oral at bedtime    MEDICATIONS  (PRN):  ALBUTerol    0.083% 2.5 milliGRAM(s) Nebulizer every 6 hours PRN Bronchospasm  ketorolac   Injectable 30 milliGRAM(s) IV Push every 6 hours PRN Moderate Pain (4 - 6)  senna 2 Tablet(s) Oral at bedtime PRN Constipation      Physical exam  Gen NAD  Neuro AAOx3  Card RRR  Pulm clear  Abd soft  Ext warm    Tubes: Left CT to suction, no AL    I&O's Summary      Assessment  23y Male  w/ PAST MEDICAL & SURGICAL HISTORY:  No significant past surgical history  admitted with Left sided spontaneous Pneumothorax (09 May 2018 08:14) s/p pigtail placement      PLAN  after checking CXR this am for PTX, will place to [facility] and repeat CXR if no PTX    Discussed with Cardiothoracic Team at AM rounds.
Subjective:  24 y/o M with complaints of shortness of breath and chest pain x 1 day.  Pt found to have a large left spontaenous pneumothorax.  Left pigtail catheter placed in Ed with post procedure cxr showing good re-expansion. 5/9 noted to have reaccumulation of PTX, CT placed to suction. Pt was placed to waterseal yesterday, positive apical PTX, stable. Pt currently resting comfortably, no complaints ON. Breathing without difficulty, actually feels like he is breathing better then last several weeks. C/o mild Tube incision site tenderness    Vital Signs:  Vital Signs Last 24 Hrs  T(C): 36.8 (05-11-18 @ 04:41), Max: 37.1 (05-10-18 @ 17:05)  T(F): 98.2 (05-11-18 @ 04:41), Max: 98.7 (05-10-18 @ 17:05)  HR: 81 (05-11-18 @ 04:41) (68 - 84)  BP: 119/64 (05-11-18 @ 04:41) (106/80 - 119/64)  RR: 18 (05-10-18 @ 10:57) (18 - 18)  SpO2: 97% (05-11-18 @ 04:41) (97% - 98%) on (O2)    Telemetry/Alarms:    Relevant labs, radiology and Medications reviewed            MEDICATIONS  (STANDING):  ALBUTerol    90 MICROgram(s) HFA Inhaler 1 Puff(s) Inhalation every 4 hours  buDESOnide 160 MICROgram(s)/formoterol 4.5 MICROgram(s) Inhaler 2 Puff(s) Inhalation two times a day  docusate sodium 100 milliGRAM(s) Oral three times a day  loratadine 10 milliGRAM(s) Oral daily  montelukast 10 milliGRAM(s) Oral at bedtime    MEDICATIONS  (PRN):  ALBUTerol    0.083% 2.5 milliGRAM(s) Nebulizer every 6 hours PRN Bronchospasm  ketorolac   Injectable 30 milliGRAM(s) IV Push every 6 hours PRN Moderate Pain (4 - 6)  senna 2 Tablet(s) Oral at bedtime PRN Constipation      Physical exam  Gen NAD  Neuro AAOx3  Card RRR  Pulm clear  Abd soft NT  Ext warm, no edema    Tubes: left CT waterseal, no AL    I&O's Summary      Assessment  23y Male  w/ PAST MEDICAL & SURGICAL HISTORY:  No significant past surgical history  admitted with complaints of Patient is a 23y old  Male who presents with a chief complaint of Left sided spontaneous Pneumothorax (09 May 2018 08:14)  s/p CT placement    PLAN  will review CXR with Dr. Mayen this am,  probably will remove tube, as no AL and had small apical PTX which has not increased in size    Discussed with Cardiothoracic Team at AM rounds.
Subjective:    pat no new complaint, still waiting for chest xray, possible removal of tube after ok with surgery.    Home Medications:  Advair Diskus 100 mcg-50 mcg inhalation powder: 1 puff(s) inhaled 2 times a day (09 May 2018 08:23)  Singulair 10 mg oral tablet: 1 tab(s) orally once a day (09 May 2018 08:23)  ZyrTEC 5 mg oral tablet: 1 tab(s) orally once a day (09 May 2018 08:23)    MEDICATIONS  (STANDING):  ALBUTerol    90 MICROgram(s) HFA Inhaler 1 Puff(s) Inhalation every 4 hours  buDESOnide 160 MICROgram(s)/formoterol 4.5 MICROgram(s) Inhaler 2 Puff(s) Inhalation two times a day  docusate sodium 100 milliGRAM(s) Oral three times a day  loratadine 10 milliGRAM(s) Oral daily  montelukast 10 milliGRAM(s) Oral at bedtime    MEDICATIONS  (PRN):  ALBUTerol    0.083% 2.5 milliGRAM(s) Nebulizer every 6 hours PRN Bronchospasm  ketorolac   Injectable 30 milliGRAM(s) IV Push every 6 hours PRN Moderate Pain (4 - 6)  senna 2 Tablet(s) Oral at bedtime PRN Constipation      Allergies    No Known Allergies    Intolerances        Vital Signs Last 24 Hrs  T(C): 36.8 (11 May 2018 04:41), Max: 37.1 (10 May 2018 17:05)  T(F): 98.2 (11 May 2018 04:41), Max: 98.7 (10 May 2018 17:05)  HR: 81 (11 May 2018 04:41) (68 - 84)  BP: 119/64 (11 May 2018 04:41) (106/80 - 119/64)  BP(mean): --  RR: 18 (10 May 2018 10:57) (18 - 18)  SpO2: 97% (11 May 2018 04:41) (97% - 98%)      PHYSICAL EXAMINATION:    NECK:  Supple. No lymphadenopathy. Jugular venous pressure not elevated. Carotids equal.   HEART:   The cardiac impulse has a normal quality. Reg., Nl S1 and S2.  There are no murmurs, rubs or gallops noted  CHEST:  Chest is clear to auscultation. Normal respiratory effort.  ABDOMEN:  Soft and nontender.   EXTREMITIES:  There is no edema.       LABS:

## 2018-05-11 NOTE — PROGRESS NOTE ADULT - ASSESSMENT
PROBLEMS:    Spontaneous Lt Pneumothorax S/P PIGTAIL CATH  Moderate perisitant Asthma  Seasonal environmental Allergies    PLAN:    pulmonary stable  ct surgery fu  symbicort  singulair  loratadine  albuter nebs prn  dvt prophylasix
PROBLEMS:    Spontaneous Lt Pneumothorax S/P PIGTAIL CATH  Moderate perisitant Asthma  Seasonal environmental Allergies    PLAN:    pulmonary stable  ct surgery fu  symbicort  singulair  loratadine  albuter nebs prn  dvt prophylasix
Pt is a 22 y/o male with h/o Asthma, seasonal allergies who is very active developed sudden onset of Lt sided chest pain and SOB.  Outpt CXR ordered by his allergist revealed large Lt pneumothorax.  Pt was sent to  ER, 14F pigtail cath placed with good response.  Pt was admitted by Dr Mayen, medicine consult called for comanagement.     * Spontaneous Lt Pneumothorax-continue CT management per Dr Mayen, encourage use of incentive spirometry  * Asthma-stable, continue advair, singular   * Seasonal Allergies-zyrtec  * Disp-OOB, ambulate  * Comm- d/w pt, RN  * Proph- pt is ambulating   * Disp-d/c planning once CT removed.
Pt is a 24 y/o male with h/o Asthma, seasonal allergies who is very active developed sudden onset of Lt sided chest pain and SOB.  Outpt CXR ordered by his allergist revealed large Lt pneumothorax.  Pt was sent to  ER, 14F pigtail cath placed with good response.  Pt was admitted by Dr Mayen, medicine consult called for comanagement.     * Spontaneous Lt Pneumothorax-doing well, continue CT management per Dr Mayen, encourage use of incentive spirometry  * Asthma-stable, continue advair, singular   * Seasonal Allergies-zyrtec  * Comm- d/w pt, RN  * Proph- pt is ambulating   * Disp-d/c planning once CT removed.

## 2018-05-11 NOTE — DISCHARGE NOTE ADULT - HOSPITAL COURSE
24 yo with spontaneous PTX, h/o asthma. S/p pigtail catheter placed and resolution of PTX. CT removed w/o complication.  To f/u for VATS, bleb resection outpatient.

## 2018-05-11 NOTE — PROGRESS NOTE ADULT - PROVIDER SPECIALTY LIST ADULT
Internal Medicine
Internal Medicine
Pulmonology
Thoracic Surgery
Pulmonology

## 2018-05-11 NOTE — DISCHARGE NOTE ADULT - PATIENT PORTAL LINK FT
You can access the [employer] Patient Portal, offered by [facility], by registering with the following website: [email or web address]

## 2018-05-14 DIAGNOSIS — R06.02 SHORTNESS OF BREATH: ICD-10-CM

## 2018-05-14 DIAGNOSIS — Z79.51 LONG TERM (CURRENT) USE OF INHALED STEROIDS: ICD-10-CM

## 2018-05-14 DIAGNOSIS — J93.83 OTHER PNEUMOTHORAX: ICD-10-CM

## 2018-05-14 DIAGNOSIS — J45.40 MODERATE PERSISTENT ASTHMA, UNCOMPLICATED: ICD-10-CM

## 2018-05-14 DIAGNOSIS — J30.2 OTHER SEASONAL ALLERGIC RHINITIS: ICD-10-CM

## 2019-08-06 PROBLEM — J45.909 UNSPECIFIED ASTHMA, UNCOMPLICATED: Chronic | Status: ACTIVE | Noted: 2018-05-18

## 2019-08-15 ENCOUNTER — APPOINTMENT (OUTPATIENT)
Dept: INTERNAL MEDICINE | Facility: CLINIC | Age: 24
End: 2019-08-15
Payer: COMMERCIAL

## 2019-08-15 VITALS
DIASTOLIC BLOOD PRESSURE: 72 MMHG | TEMPERATURE: 98.3 F | OXYGEN SATURATION: 98 % | BODY MASS INDEX: 21.98 KG/M2 | SYSTOLIC BLOOD PRESSURE: 118 MMHG | HEART RATE: 67 BPM | HEIGHT: 68 IN | RESPIRATION RATE: 18 BRPM | WEIGHT: 145 LBS

## 2019-08-15 DIAGNOSIS — Z23 ENCOUNTER FOR IMMUNIZATION: ICD-10-CM

## 2019-08-15 PROCEDURE — 94729 DIFFUSING CAPACITY: CPT

## 2019-08-15 PROCEDURE — 94060 EVALUATION OF WHEEZING: CPT

## 2019-08-15 PROCEDURE — 94727 GAS DIL/WSHOT DETER LNG VOL: CPT

## 2019-08-15 PROCEDURE — 99205 OFFICE O/P NEW HI 60 MIN: CPT | Mod: 25

## 2019-08-15 PROCEDURE — ZZZZZ: CPT

## 2019-08-15 NOTE — HISTORY OF PRESENT ILLNESS
[FreeTextEntry1] : Mr. Garcia is a 24-year-old male who presents for initial pulmonary evaluation. Is a history of asthma. Patient currently uses Symbicort 80/4.5 migrans 2 puffs b.i.d. on an intermittent basis. He also has Singulair 10 mg daily and Xopenex metered-dose inhaler for rescue therapy. Angel states that he has had asthma since childhood. He uses the Symbicort and his Singulair on an intermittent basis only. He can go weeks without using them. He is active and exercises with no difficulty. Angel does have seasonal allergies. Patient also has a history significant for left-sided spontaneous pneumothorax one year ago. That was his second left-sided spontaneous pneumothorax. He was treated with them pigtail catheter chest tube and subsequently underwent bleb resection by Dr. Osmany Mayen. At the time of his second pneumothorax the patient was awoken from sleep with chest pain. He is a nonsmoker.

## 2019-08-15 NOTE — PHYSICAL EXAM
[General Appearance - Well Developed] : well developed [Normal Appearance] : normal appearance [Well Groomed] : well groomed [General Appearance - Well Nourished] : well nourished [No Deformities] : no deformities [Normal Conjunctiva] : the conjunctiva exhibited no abnormalities [General Appearance - In No Acute Distress] : no acute distress [Eyelids - No Xanthelasma] : the eyelids demonstrated no xanthelasmas [Normal Oropharynx] : normal oropharynx [Neck Cervical Mass (___cm)] : no neck mass was observed [Neck Appearance] : the appearance of the neck was normal [Jugular Venous Distention Increased] : there was no jugular-venous distention [Thyroid Diffuse Enlargement] : the thyroid was not enlarged [Thyroid Nodule] : there were no palpable thyroid nodules [Heart Rate And Rhythm] : heart rate and rhythm were normal [Heart Sounds] : normal S1 and S2 [Murmurs] : no murmurs present [Exaggerated Use Of Accessory Muscles For Inspiration] : no accessory muscle use [Respiration, Rhythm And Depth] : normal respiratory rhythm and effort [Auscultation Breath Sounds / Voice Sounds] : lungs were clear to auscultation bilaterally [Abdomen Soft] : soft [Abdomen Tenderness] : non-tender [Abdomen Mass (___ Cm)] : no abdominal mass palpated [Abnormal Walk] : normal gait [Gait - Sufficient For Exercise Testing] : the gait was sufficient for exercise testing [Nail Clubbing] : no clubbing of the fingernails [Cyanosis, Localized] : no localized cyanosis [Petechial Hemorrhages (___cm)] : no petechial hemorrhages [Skin Turgor] : normal skin turgor [Skin Color & Pigmentation] : normal skin color and pigmentation [] : no rash [Deep Tendon Reflexes (DTR)] : deep tendon reflexes were 2+ and symmetric [Oriented To Time, Place, And Person] : oriented to person, place, and time [Sensation] : the sensory exam was normal to light touch and pinprick [No Focal Deficits] : no focal deficits [Impaired Insight] : insight and judgment were intact [Affect] : the affect was normal

## 2019-08-15 NOTE — ASSESSMENT
[FreeTextEntry1] : Angel presents for initial pulmonary evaluation. History of mild intermittent asthma. He will use Xopenex on a p.r.n. basis. Patient can discontinue Symbicort therapy. He will use Singulair on an as-needed basis during the episodes of significant allergies. 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 He's had no further symptoms. Patient will followup in one year.

## 2019-08-15 NOTE — PROCEDURE
[FreeTextEntry1] : Complete pulmonary function tests showed no evidence of obstructive or restrictive lung disease. Spirometry, lung items and flow-volume loop within normal limits. Diffusing capacity is normal.

## 2020-12-01 ENCOUNTER — OUTPATIENT (OUTPATIENT)
Dept: OUTPATIENT SERVICES | Facility: HOSPITAL | Age: 25
LOS: 1 days | End: 2020-12-01
Payer: COMMERCIAL

## 2020-12-01 DIAGNOSIS — Z11.59 ENCOUNTER FOR SCREENING FOR OTHER VIRAL DISEASES: ICD-10-CM

## 2020-12-01 LAB — SARS-COV-2 RNA SPEC QL NAA+PROBE: SIGNIFICANT CHANGE UP

## 2020-12-01 PROCEDURE — U0003: CPT

## 2020-12-02 DIAGNOSIS — Z11.59 ENCOUNTER FOR SCREENING FOR OTHER VIRAL DISEASES: ICD-10-CM

## 2021-01-18 ENCOUNTER — TRANSCRIPTION ENCOUNTER (OUTPATIENT)
Age: 26
End: 2021-01-18

## 2021-04-28 ENCOUNTER — EMERGENCY (EMERGENCY)
Facility: HOSPITAL | Age: 26
LOS: 1 days | Discharge: HOME | End: 2021-04-28
Attending: EMERGENCY MEDICINE
Payer: COMMERCIAL

## 2021-04-28 VITALS — WEIGHT: 145.06 LBS | HEIGHT: 66 IN

## 2021-04-28 VITALS
OXYGEN SATURATION: 98 % | RESPIRATION RATE: 16 BRPM | SYSTOLIC BLOOD PRESSURE: 122 MMHG | DIASTOLIC BLOOD PRESSURE: 76 MMHG | TEMPERATURE: 98 F | HEART RATE: 93 BPM
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
## 2021-04-28 DIAGNOSIS — S93.402A SPRAIN OF UNSPECIFIED LIGAMENT OF LEFT ANKLE, INITIAL ENCOUNTER: ICD-10-CM
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
## 2021-04-28 DIAGNOSIS — M25.572 PAIN IN LEFT ANKLE AND JOINTS OF LEFT FOOT: ICD-10-CM

## 2021-04-28 DIAGNOSIS — J45.909 UNSPECIFIED ASTHMA, UNCOMPLICATED: ICD-10-CM
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
## 2021-04-28 PROCEDURE — 99283 EMERGENCY DEPT VISIT LOW MDM: CPT | Mod: 25

## 2021-04-28 PROCEDURE — 73610 X-RAY EXAM OF ANKLE: CPT | Mod: LT

## 2021-04-28 PROCEDURE — 73610 X-RAY EXAM OF ANKLE: CPT | Mod: 26,LT

## 2021-04-28 PROCEDURE — 29505 APPLICATION LONG LEG SPLINT: CPT

## 2021-04-28 NOTE — ED STATDOCS - MUSCULOSKELETAL, MLM
range of motion is not limited and there is no muscle tenderness. no proximal fibular tenderness, no tenderness over 5th metatarsal

## 2021-04-28 NOTE — ED STATDOCS - MDM PATIENT STATEMENT FOR ADDL TREATMENT
Patient with one or more new problems requiring additional work-up/treatment. I will START or STAY ON the medications listed below when I get home from the hospital:    acetaminophen 325 mg oral tablet  -- 2 tab(s) by mouth every 4 hours, As needed, Moderate Pain (4 - 6)  -- Indication: For Pain    acetaminophen-oxycodone 325 mg-5 mg oral tablet  -- 1 tab(s) by mouth every 6 hours, As needed, Severe Pain (7 - 10)  -- Indication: For Pain    aspirin 81 mg oral tablet, chewable  -- 1 tab(s) by mouth once a day  -- Indication: For Heart    losartan 50 mg oral tablet  -- 1 tab(s) by mouth once a day  -- Indication: For bp    levETIRAcetam 1000 mg oral tablet  -- 1 tab(s) by mouth 2 times a day  -- Indication: For Seizure    lamoTRIgine 100 mg oral tablet  -- 1 tab(s) by mouth once a day  -- Indication: For Seizure    atorvastatin 40 mg oral tablet  -- 1 tab(s) by mouth once a day (at bedtime)  -- Indication: For lipitor    carvedilol 25 mg oral tablet  -- 1 tab(s) by mouth every 12 hours  -- Indication: For Heart    amLODIPine 10 mg oral tablet  -- 1 tab(s) by mouth once a day  -- Indication: For bp    furosemide 20 mg oral tablet  -- 1 tab(s) by mouth once a day  -- Indication: For Heart    docusate sodium 100 mg oral capsule  -- 1 cap(s) by mouth 3 times a day  -- Indication: For Constipation    polyethylene glycol 3350 oral powder for reconstitution  -- 17 gram(s) by mouth once a day  -- Indication: For Constipation    senna oral tablet  -- 2 tab(s) by mouth once a day (at bedtime)  -- Indication: For Constipation    timolol maleate 0.5% ophthalmic gel forming solution  -- 1 drop(s) to each affected eye once a day  -- Indication: For Eyes    Restasis 0.05% ophthalmic emulsion  -- 1 drop(s) to each affected eye every 12 hours  -- Indication: For Eyes    Travatan 0.004% ophthalmic solution  -- 1 drop(s) to each affected eye once a day (in the evening)  -- Indication: For Eyes    Zyvox 600 mg oral tablet  -- 1 tab(s) by mouth every 12 hours x 5days  -- Indication: For Antibiotic    Multiple Vitamins oral tablet  -- 1 tab(s) by mouth once a day  -- Indication: For Vitamin    cholecalciferol oral tablet  -- 1000 unit(s) by mouth once a day  -- Indication: For Vitamin

## 2021-04-28 NOTE — ED STATDOCS - PROGRESS NOTE DETAILS
27 y/o M with no PMH presents with ankle injury while playing basketball today. States he felt/heard a "pop" in his left ankle. Reports difficulty walking since injury. 27 y/o M with no PMH presents with ankle injury while playing basketball today. States he felt/heard a "pop" in his left ankle. Reports difficulty walking since injury. PE: Well appearing. MSK: +edema and ecchymosis over left lateral malleolus. No deformity noted to Achilles tendon. Full plantar and dorsiflexion in left ankle. Sensation intact to light touch in lower extremities. 2+ DP and PT pulses bilaterally. A/P; r/o fracture, plan for XR and reassess. - Wagner Lyle PA-C XR negative for fracture. Will splint and dc home. Patient ambulatory with crutches. - Wagner Lyle PA-C

## 2021-04-28 NOTE — ED STATDOCS - CARE PROVIDER_API CALL
Bhavesh Downs (DO)  Orthopaedic Surgery  [address] [place] [address]  Phone: [phone]  Fax: [phone]  Follow Up Time:

## 2021-04-28 NOTE — ED STATDOCS - OBJECTIVE STATEMENT
26 year old male presents to the ED c/o ankle pain. Pt felt a "pop snap" in lateral left ankle while playing basketball unable to walk due to pain 600mg of Motrin PTA.

## 2021-04-28 NOTE — ED STATDOCS - PATIENT PORTAL LINK FT
You can access the FollowMyHealth Patient Portal offered by [facility] by registering at the following website: [email or web address]. By joining [employer]’s FollowMyHealth portal, you will also be able to view your health information using other applications (apps) compatible with our system.

## 2021-04-28 NOTE — ED STATDOCS - ATTENDING CONTRIBUTION TO CARE
Dr. Livingston: I performed a face to face bedside interview with patient regarding history of present illness, review of symptoms and past medical history. I completed an independent physical exam.  I have discussed patient's plan of care with PA.   I agree with note as stated above, having amended the EMR as needed to reflect my findings.   This includes HISTORY OF PRESENT ILLNESS, HIV, PAST MEDICAL/SURGICAL/FAMILY/SOCIAL HISTORY, ALLERGIES AND HOME MEDICATIONS, REVIEW OF SYSTEMS, PHYSICAL EXAM, and any PROGRESS NOTES during the time I functioned as the attending physician for this patient.

## 2021-04-28 NOTE — ED STATDOCS - NSFOLLOWUPINSTRUCTIONS_ED_ALL_ED_FT
KEEP FOOT ELEVATED WHILE RESTING. WEAR ACE WRAP AT ALL TIMES. USE AIRCAST WHEN WALKING.     Elastic Bandage and RICE Therapy    Elastic bandages come in different shapes and sizes. They generally provide support to your injury and reduce swelling while you are healing, but they can perform different functions. Your health care provider will help you to decide what is best for your protection, recovery, or rehabilitation after an injury.  The routine care of many injuries includes rest, ice, compression, and elevation (RICE therapy). RICE therapy is often recommended for injuries to soft tissues, such as muscle strain, sprains, bruises, and overuse injuries. It can also be used for some bone injuries. Using RICE therapy can help to relieve pain and lessen swelling.  What are some general tips for using an elastic bandage?  Use the bandage as directed by the maker of the bandage that you are using.Do not wrap the bandage too tightly. This may block (cut off) the circulation in the arm or leg in the area below the bandage.  If part of your body beyond the bandage becomes blue, numb, cold, swollen, or more painful, your bandage is probably too tight. If this occurs, remove your bandage and reapply it more loosely.Remove and reapply an elastic bandage every 3–4 hours or as told by your health care provider.See your health care provider if the bandage seems to be making your problems worse rather than better.How to care for your injury with RICE therapy  Rest    Rest your injury. This may help with the healing process. Rest usually involves limiting your normal activities and not using the injured part of your body. Generally, you can return to your normal activities when your health care provider says it is okay and you can do them without much discomfort.  If you rest the injury too much, it may not heal as well. Some injuries heal better with early movement instead of resting for too long. Talk with your health care provider about how you should limit your activities and whether you should start range-of-motion exercises for your injury.  Ice    Ice your injury to lessen swelling and pain. Do not apply ice directly to your skin.  Put ice in a plastic bag.Place a towel between your skin and the bag.Leave the ice on for 20 minutes, 2–3 times a day. Use ice on as many days as told by your health care provider.  Compression    Put pressure (compression) on your injured area to control swelling, give support, and help with discomfort. Compression may be done with an elastic bandage.  Elevation    Raise (elevate) your injured area to lessen swelling and pain. If possible, elevate your injured area at or above the level of your heart or the center of your chest.  Contact a health care provider if:  Your pain and swelling continue.Your symptoms are getting worse rather than improving.Having these problems may mean that you need further evaluation or imaging tests, such as X-rays or an MRI. Sometimes, X-rays may not show a small broken bone (fracture) until days after the injury happened. Make a follow-up appointment with your health care provider. Ask your health care provider, or the department that is doing the imaging test, when your results will be ready.  Get help right away if:  You have sudden severe pain at or below the area of your injury.You have redness or increased swelling around your injury.You have tingling or numbness at or below the area of your injury and it does not improve after you remove the elastic bandage.Summary  Elastic bandages provide support to your injury and reduce swelling while you are healing. Your health care provider will help you decide which type of elastic bandage is best for your injury.Do not wrap the bandage too tightly. This may block (cut off) the circulation in the arm or leg in the area below the bandage.Putting pressure (compression) on your injured area with an elastic bandage is part of RICE therapy. RICE therapy includes rest, ice, compression, and elevation. This treatment is recommended for the routine care of many injuries.This information is not intended to replace advice given to you by your health care provider. Make sure you discuss any questions you have with your health care provider. KEEP FOOT ELEVATED WHILE RESTING. WEAR ACE WRAP AT ALL TIMES. USE AIRCAST WHEN WALKING. AMBULATE WITH CRUTCHES. FOLLOW UP WITH ORTHO IF SYMPTOMS DO NOT IMPROVE.     Elastic Bandage and RICE Therapy    Elastic bandages come in different shapes and sizes. They generally provide support to your injury and reduce swelling while you are healing, but they can perform different functions. Your health care provider will help you to decide what is best for your protection, recovery, or rehabilitation after an injury.  The routine care of many injuries includes rest, ice, compression, and elevation (RICE therapy). RICE therapy is often recommended for injuries to soft tissues, such as muscle strain, sprains, bruises, and overuse injuries. It can also be used for some bone injuries. Using RICE therapy can help to relieve pain and lessen swelling.  What are some general tips for using an elastic bandage?  Use the bandage as directed by the maker of the bandage that you are using.Do not wrap the bandage too tightly. This may block (cut off) the circulation in the arm or leg in the area below the bandage.  If part of your body beyond the bandage becomes blue, numb, cold, swollen, or more painful, your bandage is probably too tight. If this occurs, remove your bandage and reapply it more loosely.Remove and reapply an elastic bandage every 3–4 hours or as told by your health care provider.See your health care provider if the bandage seems to be making your problems worse rather than better.How to care for your injury with RICE therapy  Rest    Rest your injury. This may help with the healing process. Rest usually involves limiting your normal activities and not using the injured part of your body. Generally, you can return to your normal activities when your health care provider says it is okay and you can do them without much discomfort.  If you rest the injury too much, it may not heal as well. Some injuries heal better with early movement instead of resting for too long. Talk with your health care provider about how you should limit your activities and whether you should start range-of-motion exercises for your injury.  Ice    Ice your injury to lessen swelling and pain. Do not apply ice directly to your skin.  Put ice in a plastic bag.Place a towel between your skin and the bag.Leave the ice on for 20 minutes, 2–3 times a day. Use ice on as many days as told by your health care provider.  Compression    Put pressure (compression) on your injured area to control swelling, give support, and help with discomfort. Compression may be done with an elastic bandage.  Elevation    Raise (elevate) your injured area to lessen swelling and pain. If possible, elevate your injured area at or above the level of your heart or the center of your chest.  Contact a health care provider if:  Your pain and swelling continue.Your symptoms are getting worse rather than improving.Having these problems may mean that you need further evaluation or imaging tests, such as X-rays or an MRI. Sometimes, X-rays may not show a small broken bone (fracture) until days after the injury happened. Make a follow-up appointment with your health care provider. Ask your health care provider, or the department that is doing the imaging test, when your results will be ready.  Get help right away if:  You have sudden severe pain at or below the area of your injury.You have redness or increased swelling around your injury.You have tingling or numbness at or below the area of your injury and it does not improve after you remove the elastic bandage.Summary  Elastic bandages provide support to your injury and reduce swelling while you are healing. Your health care provider will help you decide which type of elastic bandage is best for your injury.Do not wrap the bandage too tightly. This may block (cut off) the circulation in the arm or leg in the area below the bandage.Putting pressure (compression) on your injured area with an elastic bandage is part of RICE therapy. RICE therapy includes rest, ice, compression, and elevation. This treatment is recommended for the routine care of many injuries.This information is not intended to replace advice given to you by your health care provider. Make sure you discuss any questions you have with your health care provider.

## 2021-05-03 NOTE — ED ADULT TRIAGE NOTE - ADDITIONAL SAFETY/BANDS...
Called the patient to review abnormal ultrasound, abnormal pulmonary findings on CT chest   No answer  Have messaged our coordinator at [facility] to review with the patient also  Additional Safety/Bands:

## 2021-05-06 ENCOUNTER — NON-APPOINTMENT (OUTPATIENT)
Age: 26
End: 2021-05-06

## 2021-05-06 ENCOUNTER — APPOINTMENT (OUTPATIENT)
Dept: ORTHOPEDIC SURGERY | Facility: CLINIC | Age: 26
End: 2021-05-06
Payer: COMMERCIAL

## 2021-05-06 PROCEDURE — 99072 ADDL SUPL MATRL&STAF TM PHE: CPT

## 2021-05-06 PROCEDURE — 99204 OFFICE O/P NEW MOD 45 MIN: CPT | Mod: 25

## 2021-05-06 PROCEDURE — 97760 ORTHOTIC MGMT&TRAING 1ST ENC: CPT

## 2021-05-06 NOTE — HISTORY OF PRESENT ILLNESS
[FreeTextEntry1] : Angel is a 25 yo male who presents with a left ankle injury after he rolled his ankle jumping while playing basketball and came down on it wrong.  He went to [facility] on the day of the injury.  X-rays were negative for fracture.  He presents using crutches and wearing an air cast.  Pain scale 2/10.  No numbness or tingling in the ankle and foot.  No other complaints.

## 2021-05-06 NOTE — PHYSICAL EXAM
[de-identified] : Right ankle Physical Examination:\par \par General: Alert and oriented x3.  In no acute distress.  Pleasant in nature with a normal affect.  No apparent respiratory distress. \par Erythema, Warmth, Rubor: Negative\par Swelling: Positive swelling and bruising present\par \par ROM:\par 1. Dorsiflexion: 10 degrees\par 2. Plantarflexion: 40 degrees\par 3. Inversion: 10 degrees\par 4. Eversion: 10 degrees\par \par Tenderness to Palpation: \par 1. Lateral Malleolus: Negative\par 2. Medial Malleolus: Negative\par 3. Proximal Fibular Pain: Negative\par 4. Heel Pain: Negative\par 5. Cuboid: Negative\par 6. Navicular: Negative\par 7. Tibiotalar Joint: Negative\par 8. Subtalar Joint: Negative\par 9. Posterior Recess: Negative\par \par Tendon Pain:\par 1. Achilles: Negative\par 2. Peroneals: Negative\par 3. Posterior Tibialis: Negative\par 4. Tibialis Anterior: Negative\par \par Ligament Pain:\par 1. ATFL: Positive\par 2. CFL: Negative \par 3. PTFL: Negative\par 4. Deltoid Ligaments: Negative\par 5. Lis Franc Ligament: Negative\par \par Stability: \par 1. Anterior Drawer: Negative\par 2. Posterior Drawer: Negative\par \par Strength: 5/5 TA/GS/EHL\par \par Pulses: 2+ DP/PT Pulses\par \par Neuro: Intact motor and sensory\par \par Additional Test:\par 1. Calcaneal Squeeze Test: Negative\par 2. Syndesmosis Squeeze Test: Negative [de-identified] : \par EXAM: XR ANKLE COMP MIN 3 VIEWS LT\par \par \par PROCEDURE DATE: 04/28/2021\par \par \par \par INTERPRETATION: PROCEDURE: AP, lateral, and oblique views of the left ankle.\par \par CLINICAL INFORMATION: Left ankle pain.\par \par No prior studies are available for comparison.\par \par FINDINGS:\par \par There is no acute fracture or dislocation. The ankle mortise is congruent. The talar dome is normal in contour. There is lateral soft tissue swelling.\par \par IMPRESSION:\par \par No acute fracture or dislocation.\par Lateral ankle soft tissue swelling.\par \par \par \par \par \par SHANNA NÚÑEZ MD; Attending Radiologist\par This document has been electronically signed. Apr 29 2021 4:01PM

## 2021-05-06 NOTE — DISCUSSION/SUMMARY
[de-identified] : Assessment: Left ankle Sprain/Injury.\par \par Plan:\par #1. Short CAM Boot given.  Wear as instructed for two weeks. Crutches as needed.\par #2. Antiinflammatories/Tylenol as needed.\par #3. RICE therapy\par #4. Limit activities and use of ankle for 2 weeks. \par #5. All questions answered.  Patient will follow-up in office in 2 weeks.  If symptoms worsen or patient has concerns, they may call office and/or come into office sooner if needed.

## 2021-05-20 ENCOUNTER — APPOINTMENT (OUTPATIENT)
Dept: ORTHOPEDIC SURGERY | Facility: CLINIC | Age: 26
End: 2021-05-20
Payer: COMMERCIAL

## 2021-05-20 DIAGNOSIS — S93.402A SPRAIN OF UNSPECIFIED LIGAMENT OF LEFT ANKLE, INITIAL ENCOUNTER: ICD-10-CM

## 2021-05-20 PROCEDURE — 99213 OFFICE O/P EST LOW 20 MIN: CPT

## 2021-05-20 PROCEDURE — 99072 ADDL SUPL MATRL&STAF TM PHE: CPT

## 2021-05-20 NOTE — DISCUSSION/SUMMARY
[de-identified] : Today I had a lengthy discussion with the patient regarding their left ankle injury. I have addressed all the patient's concerns surrounding the pathology of their condition. \par \par I recommend the patient undergo a course of physical therapy for the left ankle  2-3 times a week for a total of 6-8 weeks. A prescription was given for the physical therapy today. I recommend that the patient transition out of their CAM boot and into an ASO brace as tolerated. The patient was provided with the ASO brace in the office today. \par \par I would like to see the patient back in the office prn. The patient understood and verbally agreed to the treatment plan. All of their questions were answered and they were satisfied with the visit. The patient should call the office if they have any questions or experience worsening symptoms.

## 2021-05-20 NOTE — PHYSICAL EXAM
[de-identified] : Right ankle Physical Examination:\par \par General: Alert and oriented x3.  In no acute distress.  Pleasant in nature with a normal affect.  No apparent respiratory distress. \par Erythema, Warmth, Rubor: Negative\par Swelling: Positive swelling and bruising present\par \par ROM:\par 1. Dorsiflexion: 10 degrees\par 2. Plantarflexion: 40 degrees\par 3. Inversion: 10 degrees\par 4. Eversion: 10 degrees\par \par Tenderness to Palpation: \par 1. Lateral Malleolus: Negative\par 2. Medial Malleolus: Negative\par 3. Proximal Fibular Pain: Negative\par 4. Heel Pain: Negative\par 5. Cuboid: Negative\par 6. Navicular: Negative\par 7. Tibiotalar Joint: Negative\par 8. Subtalar Joint: Negative\par 9. Posterior Recess: Negative\par \par Tendon Pain:\par 1. Achilles: Negative\par 2. Peroneals: Negative\par 3. Posterior Tibialis: Negative\par 4. Tibialis Anterior: Negative\par \par Ligament Pain:\par 1. ATFL: Improved.\par 2. CFL: Negative \par 3. PTFL: Negative\par 4. Deltoid Ligaments: Negative\par 5. Lis Franc Ligament: Negative\par \par Stability: \par 1. Anterior Drawer: Negative\par 2. Posterior Drawer: Negative\par \par Strength: 5/5 TA/GS/EHL\par \par Pulses: 2+ DP/PT Pulses\par \par Neuro: Intact motor and sensory\par \par Additional Test:\par 1. Calcaneal Squeeze Test: Negative\par 2. Syndesmosis Squeeze Test: Negative [de-identified] : No new imaging.

## 2021-05-20 NOTE — HISTORY OF PRESENT ILLNESS
[FreeTextEntry1] : 5/20/2021: FORD ZHAO is a 26 year male presents for follow-up evaluation of left ankle injury. Patient states there has bee improvement with physical therapy. Patient has been wearing the CAM boot. \par \par 5/6/2021: Ford is a 25 yo male who presents with a left ankle injury after he rolled his ankle jumping while playing basketball and came down on it wrong.  He went to [facility] on the day of the injury.  X-rays were negative for fracture.  He presents using crutches and wearing an air cast.  Pain scale 2/10.  No numbness or tingling in the ankle and foot.  No other complaints.

## 2021-05-20 NOTE — ADDENDUM
[FreeTextEntry1] : I, Jose Francisco Condon, acted solely as a scribe for Dr. Bhavesh Downs on this date 05/20/2021  .\par  \par All medical record entries made by the Scribe were at my, Dr. Bhavesh Downs, direction and personally dictated by me on 05/20/2021 . I have reviewed the chart and agree that the record accurately reflects my personal performance of the history, physical exam, assessment and plan. I have also personally directed, reviewed, and agreed with the chart.

## 2022-01-25 ENCOUNTER — NON-APPOINTMENT (OUTPATIENT)
Age: 27
End: 2022-01-25

## 2022-01-25 ENCOUNTER — APPOINTMENT (OUTPATIENT)
Dept: INTERNAL MEDICINE | Facility: CLINIC | Age: 27
End: 2022-01-25
Payer: COMMERCIAL

## 2022-01-25 VITALS
BODY MASS INDEX: 21.98 KG/M2 | WEIGHT: 145 LBS | HEIGHT: 68 IN | SYSTOLIC BLOOD PRESSURE: 110 MMHG | RESPIRATION RATE: 16 BRPM | OXYGEN SATURATION: 98 % | HEART RATE: 82 BPM | TEMPERATURE: 98.2 F | DIASTOLIC BLOOD PRESSURE: 70 MMHG

## 2022-01-25 DIAGNOSIS — J45.20 MILD INTERMITTENT ASTHMA, UNCOMPLICATED: ICD-10-CM

## 2022-01-25 DIAGNOSIS — R07.89 OTHER CHEST PAIN: ICD-10-CM

## 2022-01-25 PROCEDURE — 99214 OFFICE O/P EST MOD 30 MIN: CPT | Mod: 25

## 2022-01-25 PROCEDURE — 94010 BREATHING CAPACITY TEST: CPT

## 2022-01-25 NOTE — HISTORY OF PRESENT ILLNESS
[TextBox_4] : Angel presents for a followup evaluation. He was diagnosed with a first episode of corona virus infection in March 2021. He had no lasting effects at that time. Angel developed boyer virus again in December. His symptoms started on 12/21. He had shortness of breath and left-sided chest pain. He continues to have intermittent episodes of left-sided chest pain in the pectoral area. The pain can last for 1-2 seconds or several hours. There is no associated shortness of breath. It does not feel similar to when he had his pneumothorax. The patient has a history of spontaneous pneumothorax on the left side x2. He was treated by Dr. RAH Mayen with a bullectomy after the second pneumothorax. He also has a history of mild, intermittent asthma and is currently on no medication.

## 2022-01-25 NOTE — DISCUSSION/SUMMARY
[FreeTextEntry1] : Angel presents for a followup evaluation. He is complaining of left upper chest discomfort which is atypical in nature. He does have a previous history of spontaneous left pneumothorax x2. He underwent bullectomy. Patient does not feel that his current symptoms are similar to when he had his pneumothorax. Physical examination is unremarkable and spirometry are within normal limits. Patient will be sent for a PA and lateral chest x-ray. His symptoms are most likely post coronavirus related. I've explained to the patient that they will improve over time. He will continue use albuterol inhaler on an as-needed basis.

## 2022-01-25 NOTE — PROCEDURE
[FreeTextEntry1] : Spirometry shows normal flow rates. FEV1 is 4.66 L which is 108% predicted. FVC is 5.42 L which is 105% predicted. FEV1 percent is 86%.

## 2022-01-26 ENCOUNTER — APPOINTMENT (OUTPATIENT)
Dept: RADIOLOGY | Facility: CLINIC | Age: 27
End: 2022-01-26
Payer: COMMERCIAL

## 2022-01-26 ENCOUNTER — OUTPATIENT (OUTPATIENT)
Dept: OUTPATIENT SERVICES | Facility: HOSPITAL | Age: 27
LOS: 1 days | End: 2022-01-26
Payer: COMMERCIAL

## 2022-01-26 DIAGNOSIS — R07.89 OTHER CHEST PAIN: ICD-10-CM

## 2022-01-26 DIAGNOSIS — Z87.09 PERSONAL HISTORY OF OTHER DISEASES OF THE RESPIRATORY SYSTEM: ICD-10-CM

## 2022-01-26 PROCEDURE — 71046 X-RAY EXAM CHEST 2 VIEWS: CPT | Mod: 26

## 2022-01-26 PROCEDURE — 71046 X-RAY EXAM CHEST 2 VIEWS: CPT

## 2022-01-27 ENCOUNTER — NON-APPOINTMENT (OUTPATIENT)
Age: 27
End: 2022-01-27

## 2022-07-08 ENCOUNTER — NON-APPOINTMENT (OUTPATIENT)
Age: 27
End: 2022-07-08

## 2022-07-08 ENCOUNTER — APPOINTMENT (OUTPATIENT)
Dept: INTERNAL MEDICINE | Facility: CLINIC | Age: 27
End: 2022-07-08

## 2022-07-08 VITALS
OXYGEN SATURATION: 97 % | RESPIRATION RATE: 16 BRPM | HEIGHT: 68 IN | HEART RATE: 57 BPM | BODY MASS INDEX: 22.73 KG/M2 | WEIGHT: 150 LBS | SYSTOLIC BLOOD PRESSURE: 102 MMHG | DIASTOLIC BLOOD PRESSURE: 60 MMHG | TEMPERATURE: 98.2 F

## 2022-07-08 DIAGNOSIS — Z87.09 PERSONAL HISTORY OF OTHER DISEASES OF THE RESPIRATORY SYSTEM: ICD-10-CM

## 2022-07-08 DIAGNOSIS — Z01.818 ENCOUNTER FOR OTHER PREPROCEDURAL EXAMINATION: ICD-10-CM

## 2022-07-08 DIAGNOSIS — J43.9 EMPHYSEMA, UNSPECIFIED: ICD-10-CM

## 2022-07-08 PROCEDURE — 94060 EVALUATION OF WHEEZING: CPT

## 2022-07-08 PROCEDURE — 99214 OFFICE O/P EST MOD 30 MIN: CPT | Mod: 25

## 2022-07-08 RX ORDER — IBUPROFEN 600 MG/1
600 TABLET, FILM COATED ORAL
Qty: 42 | Refills: 0 | Status: ACTIVE | COMMUNITY
Start: 2022-05-24

## 2022-07-08 RX ORDER — BUDESONIDE 1 MG/2ML
INHALANT ORAL
Refills: 0 | Status: ACTIVE | COMMUNITY

## 2022-07-08 RX ORDER — CYCLOBENZAPRINE HYDROCHLORIDE 10 MG/1
10 TABLET, FILM COATED ORAL
Qty: 30 | Refills: 0 | Status: ACTIVE | COMMUNITY
Start: 2022-05-24

## 2022-07-08 RX ORDER — PREDNISONE 10 MG
TABLET ORAL
Refills: 0 | Status: ACTIVE | COMMUNITY

## 2022-07-08 RX ORDER — LEVALBUTEROL TARTRATE 45 UG/1
AEROSOL, METERED ORAL
Refills: 0 | Status: ACTIVE | COMMUNITY

## 2022-07-08 RX ORDER — AMOXICILLIN AND CLAVULANATE POTASSIUM 500; 125 MG/1; 1/1
TABLET, FILM COATED ORAL
Refills: 0 | Status: ACTIVE | COMMUNITY

## 2022-07-08 RX ORDER — BUDESONIDE 0.5 MG/2ML
0.5 INHALANT ORAL
Qty: 120 | Refills: 0 | Status: ACTIVE | COMMUNITY
Start: 2022-03-18

## 2022-07-08 RX ORDER — MONTELUKAST 10 MG/1
10 TABLET, FILM COATED ORAL
Qty: 30 | Refills: 0 | Status: ACTIVE | COMMUNITY
Start: 2022-01-25

## 2022-07-08 RX ORDER — LEVALBUTEROL TARTRATE 45 UG/1
45 AEROSOL, METERED ORAL
Qty: 15 | Refills: 0 | Status: ACTIVE | COMMUNITY
Start: 2022-01-25

## 2022-07-08 NOTE — PROCEDURE
[FreeTextEntry1] : Spirometry shows normal flow rates. FEV1 is 4.63 L which is 108% predicted. FVC is 5.54 L which is 107% predicted. FEV1/FEC ratio is 84%.

## 2022-07-08 NOTE — DISCUSSION/SUMMARY
[FreeTextEntry1] : Mr. Garcia presents for a preoperative evaluation for scheduled nasal and sinus surgery. He has a history of spontaneous pneumothorax x2 in the past. He did have a colectomy procedure. Current spirometry shows normal flow rates. There is no contraindication to planned procedure with sedation/anesthesia. The patient will benefit from incentive spirometry and DVT prophylaxis postoperatively as per surgical protocol.

## 2022-07-08 NOTE — HISTORY OF PRESENT ILLNESS
[TextBox_4] : Mr. Garcia is a 27-year-old male with a past history significant for spontaneous pneumothorax x2 with subsequent bullectomy. He is now scheduled for extensive nasal/sinus surgery. He denies any chest pain, shortness of breath or palpitations. Patient is not on metered-dose inhaler therapy.

## 2022-12-13 ENCOUNTER — APPOINTMENT (OUTPATIENT)
Dept: INTERNAL MEDICINE | Facility: CLINIC | Age: 27
End: 2022-12-13

## 2023-02-01 NOTE — PATIENT PROFILE ADULT. - FUNCTIONAL LEVEL PRIOR: AMBULATION
(0) independent Chonodrocutaneous Helical Advancement Flap Text: The defect edges were debeveled with a #15 scalpel blade.  Given the location of the defect and the proximity to free margins a chondrocutaneous helical advancement flap was deemed most appropriate.  Using a sterile surgical marker, the appropriate advancement flap was drawn incorporating the defect and placing the expected incisions within the relaxed skin tension lines where possible.    The area thus outlined was incised deep to adipose tissue with a #15 scalpel blade.  The skin margins were undermined to an appropriate distance in all directions utilizing iris scissors.

## 2023-04-26 NOTE — CONSULT NOTE ADULT - COMMENTS
All other ROS negative Consent (Near Eyelid Margin)/Introductory Paragraph: The rationale for Mohs was explained to the patient and consent was obtained. The risks, benefits and alternatives to therapy were discussed in detail. Specifically, the risks of ectropion or eyelid deformity, infection, scarring, bleeding, prolonged wound healing, incomplete removal, allergy to anesthesia, nerve injury and recurrence were addressed. Prior to the procedure, the treatment site was clearly identified and confirmed by the patient. All components of Universal Protocol/PAUSE Rule completed.